# Patient Record
Sex: FEMALE | Race: WHITE | NOT HISPANIC OR LATINO | Employment: FULL TIME | URBAN - METROPOLITAN AREA
[De-identification: names, ages, dates, MRNs, and addresses within clinical notes are randomized per-mention and may not be internally consistent; named-entity substitution may affect disease eponyms.]

---

## 2018-05-08 ENCOUNTER — LAB (OUTPATIENT)
Dept: LAB | Facility: HOSPITAL | Age: 43
End: 2018-05-08

## 2018-05-08 ENCOUNTER — TRANSCRIBE ORDERS (OUTPATIENT)
Dept: ADMINISTRATIVE | Facility: HOSPITAL | Age: 43
End: 2018-05-08

## 2018-05-08 ENCOUNTER — HOSPITAL ENCOUNTER (OUTPATIENT)
Dept: CARDIOLOGY | Facility: HOSPITAL | Age: 43
Discharge: HOME | End: 2018-05-08
Attending: ORTHOPAEDIC SURGERY | Admitting: ORTHOPAEDIC SURGERY

## 2018-05-08 DIAGNOSIS — M25.561 RIGHT KNEE PAIN, UNSPECIFIED CHRONICITY: ICD-10-CM

## 2018-05-08 DIAGNOSIS — M25.561 RIGHT KNEE PAIN, UNSPECIFIED CHRONICITY: Primary | ICD-10-CM

## 2018-05-08 LAB
ALBUMIN SERPL-MCNC: 4.3 G/DL (ref 3.5–5.2)
ALBUMIN/GLOB SERPL: 1.3 G/DL
ALP SERPL-CCNC: 67 U/L (ref 39–117)
ALT SERPL W P-5'-P-CCNC: 42 U/L (ref 1–33)
ANION GAP SERPL CALCULATED.3IONS-SCNC: 15 MMOL/L
AST SERPL-CCNC: 34 U/L (ref 1–32)
BASOPHILS # BLD AUTO: 0.05 10*3/MM3 (ref 0–0.2)
BASOPHILS NFR BLD AUTO: 0.6 % (ref 0–1.5)
BILIRUB SERPL-MCNC: 0.4 MG/DL (ref 0.1–1.2)
BUN BLD-MCNC: 14 MG/DL (ref 6–20)
BUN/CREAT SERPL: 22.6 (ref 7–25)
CALCIUM SPEC-SCNC: 9.2 MG/DL (ref 8.6–10.5)
CHLORIDE SERPL-SCNC: 95 MMOL/L (ref 98–107)
CO2 SERPL-SCNC: 30 MMOL/L (ref 22–29)
CREAT BLD-MCNC: 0.62 MG/DL (ref 0.57–1)
DEPRECATED RDW RBC AUTO: 41.4 FL (ref 37–54)
EOSINOPHIL # BLD AUTO: 0.24 10*3/MM3 (ref 0–0.7)
EOSINOPHIL NFR BLD AUTO: 2.6 % (ref 0.3–6.2)
ERYTHROCYTE [DISTWIDTH] IN BLOOD BY AUTOMATED COUNT: 13.4 % (ref 11.7–13)
GFR SERPL CREATININE-BSD FRML MDRD: 105 ML/MIN/1.73
GLOBULIN UR ELPH-MCNC: 3.2 GM/DL
GLUCOSE BLD-MCNC: 92 MG/DL (ref 65–99)
HCT VFR BLD AUTO: 44 % (ref 35.6–45.5)
HGB BLD-MCNC: 14.5 G/DL (ref 11.9–15.5)
IMM GRANULOCYTES # BLD: 0.04 10*3/MM3 (ref 0–0.03)
IMM GRANULOCYTES NFR BLD: 0.4 % (ref 0–0.5)
LYMPHOCYTES # BLD AUTO: 2.87 10*3/MM3 (ref 0.9–4.8)
LYMPHOCYTES NFR BLD AUTO: 31.6 % (ref 19.6–45.3)
MCH RBC QN AUTO: 27.9 PG (ref 26.9–32)
MCHC RBC AUTO-ENTMCNC: 33 G/DL (ref 32.4–36.3)
MCV RBC AUTO: 84.8 FL (ref 80.5–98.2)
MONOCYTES # BLD AUTO: 0.95 10*3/MM3 (ref 0.2–1.2)
MONOCYTES NFR BLD AUTO: 10.5 % (ref 5–12)
NEUTROPHILS # BLD AUTO: 4.98 10*3/MM3 (ref 1.9–8.1)
NEUTROPHILS NFR BLD AUTO: 54.7 % (ref 42.7–76)
NRBC BLD MANUAL-RTO: 0 /100 WBC (ref 0–0)
PLATELET # BLD AUTO: 368 10*3/MM3 (ref 140–500)
PMV BLD AUTO: 11.4 FL (ref 6–12)
POTASSIUM BLD-SCNC: 2.8 MMOL/L (ref 3.5–5.2)
PROT SERPL-MCNC: 7.5 G/DL (ref 6–8.5)
RBC # BLD AUTO: 5.19 10*6/MM3 (ref 3.9–5.2)
SODIUM BLD-SCNC: 140 MMOL/L (ref 136–145)
WBC NRBC COR # BLD: 9.09 10*3/MM3 (ref 4.5–10.7)

## 2018-05-08 PROCEDURE — 80053 COMPREHEN METABOLIC PANEL: CPT

## 2018-05-08 PROCEDURE — 93010 ELECTROCARDIOGRAM REPORT: CPT | Performed by: INTERNAL MEDICINE

## 2018-05-08 PROCEDURE — 36415 COLL VENOUS BLD VENIPUNCTURE: CPT

## 2018-05-08 PROCEDURE — 93005 ELECTROCARDIOGRAM TRACING: CPT | Performed by: ORTHOPAEDIC SURGERY

## 2018-05-08 PROCEDURE — 85025 COMPLETE CBC W/AUTO DIFF WBC: CPT

## 2019-04-08 ENCOUNTER — HOSPITAL ENCOUNTER (OUTPATIENT)
Dept: OTHER | Facility: HOSPITAL | Age: 44
Discharge: HOME | End: 2019-04-08
Attending: FAMILY MEDICINE

## 2020-02-10 ENCOUNTER — OFFICE VISIT (OUTPATIENT)
Dept: ORTHOPEDIC SURGERY | Facility: CLINIC | Age: 45
End: 2020-02-10

## 2020-02-10 VITALS — TEMPERATURE: 98.3 F | BODY MASS INDEX: 47.52 KG/M2 | WEIGHT: 226.4 LBS | HEIGHT: 58 IN

## 2020-02-10 DIAGNOSIS — M22.2X1 PATELLOFEMORAL ARTHRALGIA OF BOTH KNEES: ICD-10-CM

## 2020-02-10 DIAGNOSIS — M79.642 BILATERAL HAND PAIN: ICD-10-CM

## 2020-02-10 DIAGNOSIS — M25.60 JOINT STIFFNESS: ICD-10-CM

## 2020-02-10 DIAGNOSIS — M25.561 PAIN IN BOTH KNEES, UNSPECIFIED CHRONICITY: Primary | ICD-10-CM

## 2020-02-10 DIAGNOSIS — M79.641 BILATERAL HAND PAIN: ICD-10-CM

## 2020-02-10 DIAGNOSIS — M25.50 POLYARTHRALGIA: ICD-10-CM

## 2020-02-10 DIAGNOSIS — M22.2X2 PATELLOFEMORAL ARTHRALGIA OF BOTH KNEES: ICD-10-CM

## 2020-02-10 DIAGNOSIS — M25.562 PAIN IN BOTH KNEES, UNSPECIFIED CHRONICITY: Primary | ICD-10-CM

## 2020-02-10 PROCEDURE — 73562 X-RAY EXAM OF KNEE 3: CPT | Performed by: PHYSICIAN ASSISTANT

## 2020-02-10 PROCEDURE — 20610 DRAIN/INJ JOINT/BURSA W/O US: CPT | Performed by: PHYSICIAN ASSISTANT

## 2020-02-10 PROCEDURE — 99203 OFFICE O/P NEW LOW 30 MIN: CPT | Performed by: PHYSICIAN ASSISTANT

## 2020-02-10 RX ORDER — GABAPENTIN 300 MG/1
300 CAPSULE ORAL 3 TIMES DAILY
COMMUNITY
Start: 2020-01-10

## 2020-02-10 RX ORDER — AMLODIPINE BESYLATE 10 MG/1
10 TABLET ORAL DAILY
COMMUNITY
Start: 2019-12-13

## 2020-02-10 RX ORDER — PANTOPRAZOLE SODIUM 40 MG/1
40 TABLET, DELAYED RELEASE ORAL DAILY
COMMUNITY
Start: 2020-02-02

## 2020-02-10 RX ORDER — NORGESTIMATE AND ETHINYL ESTRADIOL 7DAYSX3 28
1 KIT ORAL DAILY
COMMUNITY
Start: 2019-11-22

## 2020-02-10 RX ORDER — FLUCONAZOLE 150 MG/1
150 TABLET ORAL DAILY
COMMUNITY
Start: 2019-12-12

## 2020-02-10 RX ORDER — LIDOCAINE HYDROCHLORIDE 10 MG/ML
2 INJECTION, SOLUTION EPIDURAL; INFILTRATION; INTRACAUDAL; PERINEURAL
Status: COMPLETED | OUTPATIENT
Start: 2020-02-10 | End: 2020-02-10

## 2020-02-10 RX ORDER — BUSPIRONE HYDROCHLORIDE 15 MG/1
15 TABLET ORAL 2 TIMES DAILY
COMMUNITY
Start: 2019-12-13

## 2020-02-10 RX ORDER — METHYLPREDNISOLONE ACETATE 80 MG/ML
80 INJECTION, SUSPENSION INTRA-ARTICULAR; INTRALESIONAL; INTRAMUSCULAR; SOFT TISSUE
Status: COMPLETED | OUTPATIENT
Start: 2020-02-10 | End: 2020-02-10

## 2020-02-10 RX ORDER — CEFDINIR 300 MG/1
300 CAPSULE ORAL 2 TIMES DAILY
COMMUNITY
Start: 2019-12-12

## 2020-02-10 RX ORDER — FOLIC ACID 1 MG/1
1000 TABLET ORAL DAILY
COMMUNITY
Start: 2020-01-24

## 2020-02-10 RX ORDER — ATENOLOL AND CHLORTHALIDONE TABLET 100; 25 MG/1; MG/1
1 TABLET ORAL DAILY
COMMUNITY
Start: 2019-12-13

## 2020-02-10 RX ORDER — GUAIFENESIN AND CODEINE PHOSPHATE 10; 100 MG/5ML; MG/5ML
LIQUID ORAL
COMMUNITY
Start: 2019-12-12

## 2020-02-10 RX ORDER — LEVOTHYROXINE SODIUM 0.15 MG/1
150 TABLET ORAL DAILY
COMMUNITY
Start: 2019-12-04

## 2020-02-10 RX ORDER — DESVENLAFAXINE 100 MG/1
100 TABLET, EXTENDED RELEASE ORAL EVERY MORNING
COMMUNITY
Start: 2020-02-02

## 2020-02-10 RX ADMIN — LIDOCAINE HYDROCHLORIDE 2 ML: 10 INJECTION, SOLUTION EPIDURAL; INFILTRATION; INTRACAUDAL; PERINEURAL at 11:00

## 2020-02-10 RX ADMIN — METHYLPREDNISOLONE ACETATE 80 MG: 80 INJECTION, SUSPENSION INTRA-ARTICULAR; INTRALESIONAL; INTRAMUSCULAR; SOFT TISSUE at 11:00

## 2020-02-15 ENCOUNTER — RESULTS ENCOUNTER (OUTPATIENT)
Dept: ORTHOPEDIC SURGERY | Facility: CLINIC | Age: 45
End: 2020-02-15

## 2020-02-15 DIAGNOSIS — M79.642 BILATERAL HAND PAIN: ICD-10-CM

## 2020-02-15 DIAGNOSIS — M25.561 PAIN IN BOTH KNEES, UNSPECIFIED CHRONICITY: ICD-10-CM

## 2020-02-15 DIAGNOSIS — M25.562 PAIN IN BOTH KNEES, UNSPECIFIED CHRONICITY: ICD-10-CM

## 2020-02-15 DIAGNOSIS — M25.50 POLYARTHRALGIA: ICD-10-CM

## 2020-02-15 DIAGNOSIS — M25.60 JOINT STIFFNESS: ICD-10-CM

## 2020-02-15 DIAGNOSIS — M79.641 BILATERAL HAND PAIN: ICD-10-CM

## 2020-02-24 ENCOUNTER — TELEPHONE (OUTPATIENT)
Dept: ORTHOPEDIC SURGERY | Facility: CLINIC | Age: 45
End: 2020-02-24

## 2020-02-24 NOTE — TELEPHONE ENCOUNTER
Patient had labs done.  She is asking for us to call her work number with results when they are ready.  Her number is [phone], ext. [phone].

## 2020-02-28 NOTE — TELEPHONE ENCOUNTER
Contacted the patient and she had labs done at [facility].  I contacted [place] and spoke with Tierra in Medical Records.  She will be faxing the results.

## 2020-03-02 ENCOUNTER — TELEPHONE (OUTPATIENT)
Dept: ORTHOPEDIC SURGERY | Facility: CLINIC | Age: 45
End: 2020-03-02

## 2020-03-03 ENCOUNTER — TELEPHONE (OUTPATIENT)
Dept: ORTHOPEDIC SURGERY | Facility: CLINIC | Age: 45
End: 2020-03-03

## 2020-03-03 DIAGNOSIS — M79.641 BILATERAL HAND PAIN: ICD-10-CM

## 2020-03-03 DIAGNOSIS — M25.60 JOINT STIFFNESS: ICD-10-CM

## 2020-03-03 DIAGNOSIS — R76.8 DS DNA ANTIBODY POSITIVE: Primary | ICD-10-CM

## 2020-03-03 DIAGNOSIS — M79.642 BILATERAL HAND PAIN: ICD-10-CM

## 2020-03-03 DIAGNOSIS — M25.50 POLYARTHRALGIA: ICD-10-CM

## 2020-03-03 NOTE — TELEPHONE ENCOUNTER
Patient called for lab results.  I spoke with patient and notified her of her results.  She has a positive CRP, positive WAI and positive dsDNA antibody.  We discussed possible diagnoses such as systemic lupus erythematous.  I will refer to rheumatology for further evaluation.  Patient was in agreement to this plan.  And I currently have her scheduled for March 23 to follow-up on her knees and labs but I advised patient we do not need to do this appointment and instead can schedule her for 3-month follow-up in May.

## 2020-03-03 NOTE — TELEPHONE ENCOUNTER
PATIENT CALLED AND IS WANTING TO KNOW IF [facility]ICA WILL SIGN A HANDICAP PARKING PERMIT FOR HER.  IF AUTHORIZED, SHE WILL PICK THIS UP TOMORROW WHEN SHE DROPS OFF FMLA PAPERS TO BE COMPLETED

## 2020-04-07 ENCOUNTER — TELEPHONE (OUTPATIENT)
Dept: ORTHOPEDIC SURGERY | Facility: CLINIC | Age: 45
End: 2020-04-07

## 2020-04-07 NOTE — TELEPHONE ENCOUNTER
PATIENT CALLED AND STATED THAT SHE HAD A TELEVISIT WITH HER RHEUMATOLOGIST AND THEY RECOMMENDED THAT SHE BE OFF WORK FOR 2 WEEKS DUE TO THE FACT THAT PATIENT HAS LUPUS AND IS BEING SENT FOR EXTENSIVE XRAYS AND BLOOD WORK.  PATIENT STATED THEY HAVE ALSO STARTED HER ON A PAIN CREAM AND A NEW MEDICATION.  PATIENT STATED THAT NIAM MIDDLETON @ DR. PANDEY OFFICE RECOMMENDED PATIENT CALL OUR OFFICE TO HAVE MONICA GIVE HER THE NOTE TO BE OFF WORK FOR 2 WEEKS.    IS IT OK TO GIVE NOTE?

## 2020-04-08 ENCOUNTER — TELEPHONE (OUTPATIENT)
Dept: ORTHOPEDIC SURGERY | Facility: CLINIC | Age: 45
End: 2020-04-08

## 2020-04-08 NOTE — TELEPHONE ENCOUNTER
CALLED PATIENT AND WENT OVER MONICA'S MESSAGE BELOW.  PATIENT STATED THAT THE COVID NOTE WOULD BE SUFFICIENT AT THIS TIME SO SHE WILL CONTACT THE RHEUMATOLOGIST'S OFFICE TO OBTAIN THAT.

## 2020-04-08 NOTE — TELEPHONE ENCOUNTER
We can extend her note, I have spoken with rheumatologist office. Due to lack of staff they cannot currently do FMLA but will provide her with a note regarding the COVID and staying in due to immune compromised conditions. I will extend the FMLA if she needs that instead.

## 2020-05-15 ENCOUNTER — OFFICE VISIT (OUTPATIENT)
Dept: ORTHOPEDIC SURGERY | Facility: CLINIC | Age: 45
End: 2020-05-15

## 2020-05-15 VITALS — BODY MASS INDEX: 47.23 KG/M2 | TEMPERATURE: 98.4 F | WEIGHT: 225 LBS | HEIGHT: 58 IN

## 2020-05-15 DIAGNOSIS — M17.0 BILATERAL PRIMARY OSTEOARTHRITIS OF KNEE: Primary | ICD-10-CM

## 2020-05-15 DIAGNOSIS — G89.29 CHRONIC PAIN OF LEFT KNEE: ICD-10-CM

## 2020-05-15 DIAGNOSIS — M25.562 CHRONIC PAIN OF LEFT KNEE: ICD-10-CM

## 2020-05-15 PROCEDURE — 99214 OFFICE O/P EST MOD 30 MIN: CPT | Performed by: PHYSICIAN ASSISTANT

## 2020-05-15 PROCEDURE — 20610 DRAIN/INJ JOINT/BURSA W/O US: CPT | Performed by: PHYSICIAN ASSISTANT

## 2020-05-15 RX ORDER — IRON,CARBONYL/ASCORBIC ACID 100-250 MG
TABLET ORAL
COMMUNITY

## 2020-05-15 RX ORDER — MELOXICAM 7.5 MG/1
7.5 TABLET ORAL DAILY
Qty: 30 TABLET | Refills: 1 | Status: SHIPPED | OUTPATIENT
Start: 2020-05-15 | End: 2020-06-03

## 2020-05-15 RX ADMIN — METHYLPREDNISOLONE ACETATE 160 MG: 80 INJECTION, SUSPENSION INTRA-ARTICULAR; INTRALESIONAL; INTRAMUSCULAR; SOFT TISSUE at 09:15

## 2020-05-15 RX ADMIN — LIDOCAINE HYDROCHLORIDE 2 ML: 10 INJECTION, SOLUTION EPIDURAL; INFILTRATION; INTRACAUDAL; PERINEURAL at 09:15

## 2020-05-15 NOTE — PROGRESS NOTES
Procedure   Large Joint Arthrocentesis: R knee  Date/Time: 5/15/2020 9:15 AM  Consent given by: patient  Site marked: site marked  Timeout: Immediately prior to procedure a time out was called to verify the correct patient, procedure, equipment, support staff and site/side marked as required   Supporting Documentation  Indications: pain   Procedure Details  Location: knee - R knee  Preparation: Patient was prepped and draped in the usual sterile fashion  Needle size: 25 G  Approach: anteromedial  Medications administered: 2 mL lidocaine PF 1% 1 %; 160 mg methylPREDNISolone acetate 80 MG/ML  Patient tolerance: patient tolerated the procedure well with no immediate complications    Large Joint Arthrocentesis: L knee  Date/Time: 5/15/2020 9:15 AM  Consent given by: patient  Site marked: site marked  Timeout: Immediately prior to procedure a time out was called to verify the correct patient, procedure, equipment, support staff and site/side marked as required   Supporting Documentation  Indications: pain   Procedure Details  Location: knee - L knee  Preparation: Patient was prepped and draped in the usual sterile fashion  Needle size: 25 G  Approach: anteromedial  Medications administered: 2 mL lidocaine PF 1% 1 %; 160 mg methylPREDNISolone acetate 80 MG/ML  Patient tolerance: patient tolerated the procedure well with no immediate complications      Electronically signed by Amos Quijano PA-C, 05/19/20, 9:02 AM.

## 2020-05-19 PROBLEM — G89.29 CHRONIC PAIN OF LEFT KNEE: Status: ACTIVE | Noted: 2020-05-19

## 2020-05-19 PROBLEM — M17.0 BILATERAL PRIMARY OSTEOARTHRITIS OF KNEE: Status: ACTIVE | Noted: 2020-05-19

## 2020-05-19 PROBLEM — M25.562 CHRONIC PAIN OF LEFT KNEE: Status: ACTIVE | Noted: 2020-05-19

## 2020-05-19 RX ORDER — LIDOCAINE HYDROCHLORIDE 10 MG/ML
2 INJECTION, SOLUTION EPIDURAL; INFILTRATION; INTRACAUDAL; PERINEURAL
Status: COMPLETED | OUTPATIENT
Start: 2020-05-15 | End: 2020-05-15

## 2020-05-19 RX ORDER — METHYLPREDNISOLONE ACETATE 80 MG/ML
160 INJECTION, SUSPENSION INTRA-ARTICULAR; INTRALESIONAL; INTRAMUSCULAR; SOFT TISSUE
Status: COMPLETED | OUTPATIENT
Start: 2020-05-15 | End: 2020-05-15

## 2020-05-19 NOTE — PROGRESS NOTES
FOLLOW UP VISIT    Patient: Hannah Marion  ?  YOB: 1975    MRN: [id number]  ?  Chief Complaint   Patient presents with   • Left Knee - Follow-up, Pain   • Right Knee - Follow-up, Pain      ?  HPI:   Hannah Marion returns today for 3 months follow up on bilateral knee pain. Since our last visit she has had a visit with rheumatology via telehealth. She states she was not real pleased with her visit. She states her thyroid was more hyperactive than normal, her dose has been decreased from 150mcg to 137mcg and she was changed to brand name only. She states she was told her symptoms and lab findings were a result of the thyroid. She continues to report the left knee being more painful than the right. I administered a steroid injection last visit and she states it worked well but only lasted for about 2 weeks. Her rheumatology labs ordered by myself were positive for anti-dna (ds). I do not have records from the rheumatology visit but will request them. She reports she is barely getting around and that her pain level is quite severe and constant. She is currently seeing Dr. Gasca, new since previous visit, for low iron. Symptoms have now been present for greater than 5 months and she continues to experience progressive worsening. She continues to find that walking up and down stairs, sitting, squatting, walking and standing all seem to make her pain worse. She reports swelling as well as clicking and popping.    This patient is an established patient.  This problem is not new to this examiner.      Allergies:   Allergies   Allergen Reactions   • Levofloxacin Hives       Medications:   Home Medications:  Current Outpatient Medications on File Prior to Visit   Medication Sig   • amLODIPine (NORVASC) 10 MG tablet Take 10 mg by mouth Daily.   • atenolol-chlorthalidone (TENORETIC) 100-25 MG per tablet Take 1 tablet by mouth Daily.   • busPIRone (BUSPAR) 15 MG tablet Take 15 mg by mouth 2 (Two) Times a Day.    • cefdinir (OMNICEF) 300 MG capsule Take 300 mg by mouth 2 (Two) Times a Day.   • desvenlafaxine (PRISTIQ) 100 MG 24 hr tablet Take 100 mg by mouth Every Morning.   • fluconazole (DIFLUCAN) 150 MG tablet Take 150 mg by mouth Daily.   • folic acid (FOLVITE) 1 MG tablet Take 1,000 mcg by mouth Daily.   • gabapentin (NEURONTIN) 300 MG capsule Take 300 mg by mouth 3 (Three) Times a Day.   • GUAIATUSSIN -10 MG/5ML liquid TAKE 5 ML BY MOUTH EVERY 4 TO 6 HOURS   • Iron-Vitamin C (Iron 100/C) 100-250 MG tablet Take  by mouth.   • levothyroxine (SYNTHROID, LEVOTHROID) 150 MCG tablet Take 150 mcg by mouth Daily.   • pantoprazole (PROTONIX) 40 MG EC tablet Take 40 mg by mouth Daily.   • Potassium 75 MG tablet Take  by mouth 3 (Three) Times a Day.   • TRI-SPRINTEC 0.18/0.215/0.25 MG-35 MCG per tablet Take 1 tablet by mouth Daily.     No current facility-administered medications on file prior to visit.      Current Medications:  Scheduled Meds:  PRN Meds:.    I have reviewed the patient's medical history in detail and updated the computerized patient record.  Review and summarization of old records include:    Past Medical History:   Diagnosis Date   • Hypothyroidism      Past Surgical History:   Procedure Laterality Date   • GALLBLADDER SURGERY     • KNEE SURGERY     • THYROIDECTOMY       Social History     Occupational History   • Not on file   Tobacco Use   • Smoking status: Never Smoker   • Smokeless tobacco: Never Used   Substance and Sexual Activity   • Alcohol use: Not on file   • Drug use: Never   • Sexual activity: Defer     Family History   Problem Relation Age of Onset   • Rheumatologic disease Mother    • Osteoarthritis Father          Review of Systems  Constitutional: Negative.  Negative for fever.   Eyes: Negative.    Respiratory: Negative.    Cardiovascular: Negative.    Endocrine: Negative.    Musculoskeletal: Positive for arthralgias, gait problem and joint swelling.   Skin: Negative.  Negative for rash  "and wound.   Allergic/Immunologic: Negative.    Neurological: Negative for numbness.   Hematological: Negative.    Psychiatric/Behavioral: Negative.         Wt Readings from Last 3 Encounters:   05/15/20 102 kg (225 lb)   02/10/20 103 kg (226 lb 6.4 oz)     Ht Readings from Last 3 Encounters:   05/15/20 147.3 cm (58\")   02/10/20 147.3 cm (58\")     Body mass index is 47.03 kg/m².  Facility age limit for growth percentiles is 20 years.  Vitals:    05/15/20 0842   Temp: 98.4 °F (36.9 °C)         Physical Exam  Constitutional: Patient is oriented to person, place, and time. Appears well-developed and well-nourished.   HENT:   Head: Normocephalic and atraumatic.   Eyes: Conjunctivae and EOM are normal. Pupils are equal, round, and reactive to light.   Cardiovascular: Normal rate and intact distal pulses.   Pulmonary/Chest: Effort normal and breath sounds normal.   Musculoskeletal:   See detailed exam below   Neurological: Alert and oriented to person, place, and time. No sensory deficit. Coordination normal.   Skin: Skin is warm and dry. Capillary refill takes less than 2 seconds. No rash noted. No erythema.   Psychiatric: Patient has a normal mood and affect. Her behavior is normal. Judgment and thought content normal.   Nursing note and vitals reviewed.      Ortho Exam: Bilateral knees:  Positive patellar grind test with pain in the retropatellar region bilaterally.    Positive for high Q-angle with patella tracking laterally in high grades of flexion.   Skin and soft tissues are swollen, consistent with inflammatory changes of the patellofemoral joint.    Positive for significant tenderness over the medial patellofemoral ligaments.   Quadriceps mechanism is well preserved.   Range of motion: 0-120 degrees  Slightly positive for apprehension sign laterally.  Negative anterior and posterior drawer. No medial or lateral instability noted.   Dorsalis pedis and posterior tibial artery pulses are palpable. Common peroneal " nerve function is well preserved.        Diagnostics:  Reviewed xrays of left knee that was taken for Rheumatology, from 4/8/20, with summary of impression below:  · No acute bony abnormality, no changes from prior xrays taken 2/2020.  · Mild narrowing of medial joint space and patellofemoral space       Assessment:  Hannah was seen today for follow-up, pain, follow-up and pain.    Diagnoses and all orders for this visit:    Chronic pain of left knee  -     MRI Knee Left Without Contrast; Future    Bilateral primary osteoarthritis of knee  -     Large Joint Arthrocentesis: R knee  -     Large Joint Arthrocentesis: L knee  -     meloxicam (Mobic) 7.5 MG tablet; Take 1 tablet by mouth Daily.            Procedures  See separate procedure note   ?    Plan    New Medications Ordered This Visit   Medications   • meloxicam (Mobic) 7.5 MG tablet     Sig: Take 1 tablet by mouth Daily.     Dispense:  30 tablet     Refill:  1      · Injection: Intraarticular injection with steroid performed today in clinic given persistent pain and lack of response to conservative measures.  · Bilateral knee injections performed, with steroid, from an anteromedial approach. No immediate complications were observed.  · Scheduled knee MRI: Given knee symptoms and lack or response to conservative measures, will schedule MRI.   · Discussed patient's visit with rheumatology. I have not received records from that visit therefore I will request them. Once I review the records I will decide if we need to get a second opinion.   · Rest, ice, compression, and elevation (RICE) therapy  · Tylenol as needed/if clinically indicated  · Follow up will be based on MRI scheduling      Amos Quijano PA-C  Date of encounter: 05/15/2020   Electronically signed by Amos Quijano PA-C, 05/19/20, 9:27 AM.

## 2020-06-02 ENCOUNTER — OFFICE VISIT (OUTPATIENT)
Dept: ORTHOPEDIC SURGERY | Facility: CLINIC | Age: 45
End: 2020-06-02

## 2020-06-02 VITALS — TEMPERATURE: 97.8 F

## 2020-06-02 DIAGNOSIS — M22.2X2 PATELLOFEMORAL ARTHRALGIA OF BOTH KNEES: Primary | ICD-10-CM

## 2020-06-02 DIAGNOSIS — M25.562 PAIN IN BOTH KNEES, UNSPECIFIED CHRONICITY: ICD-10-CM

## 2020-06-02 DIAGNOSIS — M17.0 BILATERAL PRIMARY OSTEOARTHRITIS OF KNEE: ICD-10-CM

## 2020-06-02 DIAGNOSIS — M25.50 POLYARTHRALGIA: ICD-10-CM

## 2020-06-02 DIAGNOSIS — M22.2X1 PATELLOFEMORAL ARTHRALGIA OF BOTH KNEES: Primary | ICD-10-CM

## 2020-06-02 DIAGNOSIS — M25.561 PAIN IN BOTH KNEES, UNSPECIFIED CHRONICITY: ICD-10-CM

## 2020-06-02 DIAGNOSIS — M70.62 GREATER TROCHANTERIC BURSITIS OF LEFT HIP: ICD-10-CM

## 2020-06-02 PROCEDURE — 20610 DRAIN/INJ JOINT/BURSA W/O US: CPT | Performed by: PHYSICIAN ASSISTANT

## 2020-06-02 PROCEDURE — 99214 OFFICE O/P EST MOD 30 MIN: CPT | Performed by: PHYSICIAN ASSISTANT

## 2020-06-02 RX ADMIN — METHYLPREDNISOLONE ACETATE 160 MG: 80 INJECTION, SUSPENSION INTRA-ARTICULAR; INTRALESIONAL; INTRAMUSCULAR; SOFT TISSUE at 13:52

## 2020-06-02 RX ADMIN — LIDOCAINE HYDROCHLORIDE 2 ML: 10 INJECTION, SOLUTION EPIDURAL; INFILTRATION; INTRACAUDAL; PERINEURAL at 13:52

## 2020-06-02 NOTE — PROGRESS NOTES
Procedure   Large Joint Arthrocentesis: L greater trochanteric bursa  Date/Time: 6/2/2020 1:52 PM  Consent given by: patient  Site marked: site marked  Timeout: Immediately prior to procedure a time out was called to verify the correct patient, procedure, equipment, support staff and site/side marked as required   Supporting Documentation  Indications: pain and joint swelling   Procedure Details  Location: hip - L greater trochanteric bursa  Preparation: Patient was prepped and draped in the usual sterile fashion  Needle size: 22 G  Approach: lateral  Medications administered: 2 mL lidocaine PF 1% 1 %; 160 mg methylPREDNISolone acetate 80 MG/ML  Patient tolerance: patient tolerated the procedure well with no immediate complications      Electronically signed by Amos Quijano PA-C, 06/12/20, 3:10 PM.

## 2020-06-02 NOTE — PROGRESS NOTES
FOLLOW UP VISIT    Patient: Hannah Marion  ?  YOB: 1975    MRN: [id number]  ?  Chief Complaint   Patient presents with   • Left Knee - Pain   • Results     MRI LEFT KNEE       ?  HPI:   Hannah Marion is a 44 y.o. female who presents with follow up on left knee pain and MRI results. At last visit, 5/15/20, I injected both knees with steroid she states the effects lasted approximately 1 week.  She is experiencing significant pain and tenderness of the left knee.  She has been taking Tylenol arthritis and Mobic, they both seem to help somewhat but do not fully resolve her pain.  She is also experiencing left lateral hip pain due to her altered gait because of the left knee.  We have also discussed her visit with ORLANDO Lynch at [facility] rheumatology, I am still waiting on office notes to review.  Since she was not very pleased with her visit and the dsDNA antibody result was not addressed much I will look into referring her elsewhere or having her see another provider in the practice.    Primary Complaint:  Pain Location: left knee  Radiation: Into the medial aspect of the knee/tibia  Quality: aching, burning  Intensity/Severity: severe  Duration: 4 months  Progression of symptoms: yes, progressive worsening  Onset quality: gradual   Timing: constant  Aggravating Factors: going up and down stairs, sitting, rising after sitting, standing  Alleviating Factors: none   Previous Episodes: yes  Associated Symptoms: pain, swelling, clicking/popping  ADLs Affected: grooming/hygiene/toileting/personal care, dressing, ambulating, work related activities, recreational activities/sports  Previous Treatment: Tylenol, NSAIDs, oral pain medication, brace and intra-articular injection      This patient is an established patient.  This problem is not new to this examiner.      Allergies:   Allergies   Allergen Reactions   • Levofloxacin Hives       Medications:   Home Medications:  Current Outpatient  Medications on File Prior to Visit   Medication Sig   • amLODIPine (NORVASC) 10 MG tablet Take 10 mg by mouth Daily.   • atenolol-chlorthalidone (TENORETIC) 100-25 MG per tablet Take 1 tablet by mouth Daily.   • busPIRone (BUSPAR) 15 MG tablet Take 15 mg by mouth 2 (Two) Times a Day.   • cefdinir (OMNICEF) 300 MG capsule Take 300 mg by mouth 2 (Two) Times a Day.   • desvenlafaxine (PRISTIQ) 100 MG 24 hr tablet Take 100 mg by mouth Every Morning.   • fluconazole (DIFLUCAN) 150 MG tablet Take 150 mg by mouth Daily.   • folic acid (FOLVITE) 1 MG tablet Take 1,000 mcg by mouth Daily.   • gabapentin (NEURONTIN) 300 MG capsule Take 300 mg by mouth 3 (Three) Times a Day.   • GUAIATUSSIN -10 MG/5ML liquid TAKE 5 ML BY MOUTH EVERY 4 TO 6 HOURS   • Iron-Vitamin C (Iron 100/C) 100-250 MG tablet Take  by mouth.   • levothyroxine (SYNTHROID, LEVOTHROID) 150 MCG tablet Take 150 mcg by mouth Daily.   • pantoprazole (PROTONIX) 40 MG EC tablet Take 40 mg by mouth Daily.   • Potassium 75 MG tablet Take  by mouth 3 (Three) Times a Day.   • TRI-SPRINTEC 0.18/0.215/0.25 MG-35 MCG per tablet Take 1 tablet by mouth Daily.     No current facility-administered medications on file prior to visit.      Current Medications:  Scheduled Meds:  PRN Meds:.    I have reviewed the patient's medical history in detail and updated the computerized patient record.  Review and summarization of old records include:    Past Medical History:   Diagnosis Date   • Hypothyroidism      Past Surgical History:   Procedure Laterality Date   • GALLBLADDER SURGERY     • KNEE SURGERY     • THYROIDECTOMY       Social History     Occupational History   • Not on file   Tobacco Use   • Smoking status: Never Smoker   • Smokeless tobacco: Never Used   Substance and Sexual Activity   • Alcohol use: Not on file   • Drug use: Never   • Sexual activity: Defer     Family History   Problem Relation Age of Onset   • Rheumatologic disease Mother    • Osteoarthritis Father   "        Review of Systems  Constitutional: Negative.  Negative for fever.   Eyes: Negative.    Respiratory: Negative.    Cardiovascular: Negative.    Endocrine: Negative.    Musculoskeletal: Positive for arthralgias, gait problem and joint swelling.   Skin: Negative.  Negative for rash and wound.   Allergic/Immunologic: Negative.    Neurological: Negative for numbness.   Hematological: Negative.    Psychiatric/Behavioral: Negative.         Wt Readings from Last 3 Encounters:   05/15/20 102 kg (225 lb)   02/10/20 103 kg (226 lb 6.4 oz)     Ht Readings from Last 3 Encounters:   05/15/20 147.3 cm (58\")   02/10/20 147.3 cm (58\")     There is no height or weight on file to calculate BMI.  No height and weight on file for this encounter.  Vitals:    06/02/20 1301   Temp: 97.8 °F (36.6 °C)         Physical Exam  Constitutional: Patient is oriented to person, place, and time. Appears well-developed and well-nourished.   HENT:   Head: Normocephalic and atraumatic.   Eyes: Conjunctivae and EOM are normal. Pupils are equal, round, and reactive to light.   Cardiovascular: Normal rate and intact distal pulses.   Pulmonary/Chest: Effort normal and breath sounds normal.   Musculoskeletal:   See detailed exam below   Neurological: Alert and oriented to person, place, and time. No sensory deficit. Coordination normal.   Skin: Skin is warm and dry. Capillary refill takes less than 2 seconds. No rash noted. No erythema.   Psychiatric: Patient has a normal mood and affect. Her behavior is normal. Judgment and thought content normal.   Nursing note and vitals reviewed.      Ortho Exam:   Positive patellar grind test with pain in the retropatellar region.    Positive for high Q-angle with patella tracking laterally in high grades of flexion.   Skin and soft tissues are swollen, consistent with inflammatory changes of the patellofemoral joint.    Positive for tenderness over the medial patellofemoral ligaments.   Quadriceps mechanism is " well preserved.   Range of motion-Extension to Flexion: 0-120 degrees.  Negative anterior and posterior drawer. No medial or lateral instability noted.   Dorsalis pedis and posterior tibial artery pulses are palpable. Common peroneal nerve function is well preserved.        Diagnostics:  Reviewed MRI report of Left knee without contrast, performed at [facility] on 5/27/2020, summary of impression below:   · Menisci intact  · Possible discoid variant of the lateral meniscus  · Low-grade chondromalacia of the central femoral trochlea  · Small focus of moderate grade chondromalacia of the medial femoral condyle             Assessment:  Hannah was seen today for results and pain.    Diagnoses and all orders for this visit:    Patellofemoral arthralgia of both knees    Greater trochanteric bursitis of left hip  -     Large Joint Arthrocentesis: L greater trochanteric bursa    Pain in both knees, unspecified chronicity    Polyarthralgia          Plan    Orders Placed This Encounter   Procedures   • Large Joint Arthrocentesis: L greater trochanteric bursa      · Discussion of orthopaedic goals and activities and patient and/or guardian expressed appreciation.  · Ice, heat, and/or modalities as beneficial  · Reduced physical activity as appropriate and avoid offending activity  · Weight bearing parameters reviewed  · Reinjury Precautions  · Patient is encouraged to call or return for any issues or concerns.  · I spoke with Dr. Son regarding patient's symptoms and her interest in possible knee arthroscopy if he felt necessary.  I discussed with patient I do not think that this would be the case as her MRI does not show any type of meniscal tear or surgical issue.  Dr. Son agreed and recommended referral to bariatric surgery to discuss weight loss.  Not only were findings not indicative of an arthroscopy she would be a poor candidate for surgery due to her body mass index.  I have also discussed this with patient  and she is not interested in any type of bariatric surgical intervention.  She is most concerned about not being able to go to turn to work on June 23, 2020 due to the significant amount of pain she is in with weightbearing.  I also discussed with her that I have received records from [facility] rheumatology and I have reviewed those, but I have not been able to get in touch with the provider there that I typically refer to.  I will go ahead and put another request for referral to [facility] CISNEROS in that same practice where patient was previously seen.  If this is not possible I will then refer her to another practice such as Dr. Marilyn Galicia.  · We will send a refill for Mobic.  I will fill out FMLA or short-term disability papers if needed.  ·       Amos Quijano PA-C  Date of encounter: 06/02/2020     Electronically signed by Amos Quijano PA-C, 06/02/20, 1:48 PM.    EMR Dragon/Transcription disclaimer:  Much of this encounter note is an electronic transcription/translation of spoken language to printed text. The electronic translation of spoken language may permit erroneous, or at times, nonsensical words or phrases to be inadvertently transcribed; Although I have reviewed the note for such errors, some may still exist.

## 2020-06-03 ENCOUNTER — TELEPHONE (OUTPATIENT)
Dept: ORTHOPEDIC SURGERY | Facility: CLINIC | Age: 45
End: 2020-06-03

## 2020-06-03 RX ORDER — MELOXICAM 7.5 MG/1
TABLET ORAL
Qty: 90 TABLET | Refills: 0 | Status: SHIPPED | OUTPATIENT
Start: 2020-06-03 | End: 2020-10-07

## 2020-06-03 NOTE — TELEPHONE ENCOUNTER
PATIENT CALLED CHECKING STATUS OF HER PRESCRIPTIONS THAT WERE TO BE SENT IN FOR HER.  I LET HER KNOW THAT THE MOBIC HAS BEEN SENT TO HER PHARMACY.  PATIENT WAS THINKING ULTRAM WAS GOING TO BE PRESCRIBED ALSO.  PLEASE ADVISE

## 2020-06-05 ENCOUNTER — TELEPHONE (OUTPATIENT)
Dept: ORTHOPEDIC SURGERY | Facility: CLINIC | Age: 45
End: 2020-06-05

## 2020-06-05 DIAGNOSIS — M17.0 BILATERAL PRIMARY OSTEOARTHRITIS OF KNEE: Primary | ICD-10-CM

## 2020-06-05 RX ORDER — TRAMADOL HYDROCHLORIDE 50 MG/1
TABLET ORAL
Qty: 40 TABLET | Refills: 0 | OUTPATIENT
Start: 2020-06-05 | End: 2020-08-25

## 2020-06-09 ENCOUNTER — TELEPHONE (OUTPATIENT)
Dept: ORTHOPEDIC SURGERY | Facility: CLINIC | Age: 45
End: 2020-06-09

## 2020-06-09 DIAGNOSIS — G89.29 CHRONIC PAIN OF LEFT KNEE: Primary | ICD-10-CM

## 2020-06-09 DIAGNOSIS — M25.562 CHRONIC PAIN OF LEFT KNEE: Primary | ICD-10-CM

## 2020-06-09 NOTE — TELEPHONE ENCOUNTER
PATIENT CALLED IN TO CHECK ON THE STATUS OF POTENTIALLY SCHEDULING A LEFT KNEE ARTHROSCOPY. SHE STATES THAT SHE IS AWAITING A CALL FROM MONICA ONCE MONICA HAS TALKED TO DR. LARSEN ABOUT PROCEEDING. MONICA IS AWARE OF THAT AND IS NEEDING TO SPEAK WITH THE PATIENT'S RHEUMATOLOGIST. MONICA WILL CONTACT [facility] ONCE SHE HAS SPOKEN TO EVERYONE./AW

## 2020-06-12 PROBLEM — M70.62 GREATER TROCHANTERIC BURSITIS OF LEFT HIP: Status: ACTIVE | Noted: 2020-06-12

## 2020-06-12 RX ORDER — METHYLPREDNISOLONE ACETATE 80 MG/ML
160 INJECTION, SUSPENSION INTRA-ARTICULAR; INTRALESIONAL; INTRAMUSCULAR; SOFT TISSUE
Status: COMPLETED | OUTPATIENT
Start: 2020-06-02 | End: 2020-06-02

## 2020-06-12 RX ORDER — LIDOCAINE HYDROCHLORIDE 10 MG/ML
2 INJECTION, SOLUTION EPIDURAL; INFILTRATION; INTRACAUDAL; PERINEURAL
Status: COMPLETED | OUTPATIENT
Start: 2020-06-02 | End: 2020-06-02

## 2020-06-16 ENCOUNTER — TELEPHONE (OUTPATIENT)
Dept: ORTHOPEDIC SURGERY | Facility: CLINIC | Age: 45
End: 2020-06-16

## 2020-06-16 NOTE — TELEPHONE ENCOUNTER
I  CALLED PATIENT AND LET HER KNOW MONICA WOULD LIKE HER TO SEE RYAN CONNOLLY @ [facility] SO SHE IS GOING TO CALL AND MAKE AN APPOINTMENT, I TOLD HER IF [facility] HAD ANY PROBLEMS LET ME KNOW I I WILL MAKE THE ANIL FOR HER

## 2020-06-16 NOTE — TELEPHONE ENCOUNTER
----- Message from Amos Quijano PA-C sent at 6/12/2020 11:55 AM EDT -----  Regarding: REFERRAL  Can [facility] and see if we can get her in to see BLAYNE Shirley? She has seen Shikha Corral but we want to get her into [facility].  ----- Message -----  From: Eva Torres  Sent: 6/12/2020   8:33 AM EDT  To: Amos Quijano PA-C    PATIENT STATES WOULD LIKE TO SEE ANOTHER RHEUMATOLGIST. SHE WANTS TO SEE WHO YOU SEE.......

## 2020-06-18 ENCOUNTER — TELEPHONE (OUTPATIENT)
Dept: ORTHOPEDIC SURGERY | Facility: CLINIC | Age: 45
End: 2020-06-18

## 2020-06-18 NOTE — TELEPHONE ENCOUNTER
PER MONICA I CALLED THE PATIENT TO DISCUSS THE STATUS OF SCHEDULING HER WITH ANOTHER RHEUMATOLOGIST IN THE GROUP THAT SHE IS GOING TO. MONICA HAS TALKED TO RYAN IN THAT PRACTICE AND RYAN (ANOTHER PROVIDER IN THAT OFFICE) HAS ASSURED MONICA THAT SOMEONE IN HER OFFICE WILL BE CONTACTING [facility] TO MAKE ARRANGEMENTS FOR THEM TO TRANSFER HER CARE FROM WHO SHE SEES CURRENTLY TO HERSELF (RYAN). [facility]'S PHONE JUST STATED THAT SHE WAS UNAVAILABLE, IT WOULD NOT LET ME LEAVE A MESSAGE./AW

## 2020-06-24 ENCOUNTER — TELEPHONE (OUTPATIENT)
Dept: ORTHOPEDIC SURGERY | Facility: CLINIC | Age: 45
End: 2020-06-24

## 2020-06-24 NOTE — TELEPHONE ENCOUNTER
PATIENT CALLED AND STATED THAT SHE HAS NOT YET HEARD FROM THE RHEUMATOLOGIST'S OFFICE REGARDING AN APPOINTMENT.    SHE ALSO WANTED TO MAKE MONICA AWARE THAT HER INSURANCE HAS DENIED GEL INJECTIONS AND IS WANTING TO KNOW WHAT HER NEXT OPTION IS IN REGARD TO TREATING HER KNEE PAIN.

## 2020-06-30 DIAGNOSIS — M25.561 CHRONIC PAIN OF BOTH KNEES: Primary | ICD-10-CM

## 2020-06-30 DIAGNOSIS — M25.562 CHRONIC PAIN OF BOTH KNEES: Primary | ICD-10-CM

## 2020-06-30 DIAGNOSIS — G89.29 CHRONIC PAIN OF BOTH KNEES: Primary | ICD-10-CM

## 2020-06-30 RX ORDER — TRAMADOL HYDROCHLORIDE 50 MG/1
50 TABLET ORAL EVERY 8 HOURS PRN
Qty: 40 TABLET | Refills: 0 | Status: SHIPPED | OUTPATIENT
Start: 2020-06-30 | End: 2020-08-25

## 2020-06-30 NOTE — TELEPHONE ENCOUNTER
CALLED PATIENT TO LET HER KNOW THAT MONICA HAS REACHED OUT TO RYAN CONNOLLY TO LET HER KNOW THAT PATIENT HAS NOT HEARD FROM THEIR OFFICE.  I HAVE ALSO SCHEDULED PATIENT TO SEE DR. LARSEN ON 8/03/20 @ 2:30 PM

## 2020-06-30 NOTE — TELEPHONE ENCOUNTER
PATIENT REQUESTING A REFILL OF TRAMADOL. SHE WILL CALL BACK TOMORROW TO GET AN APPOINTMENT SCHEDULED WITH DR. LARSEN.MONICA WANTS HER TO SEE DR. LARSEN. SHE HAD BEEN DISCUSSING DATES WITH RIOS EARLIER. I ASKED HER TO CALL BACK TOMORROW BC I DO NOT HAVE ACCESS TO WORK HER IN/RJ

## 2020-08-25 ENCOUNTER — TELEPHONE (OUTPATIENT)
Dept: ORTHOPEDIC SURGERY | Facility: CLINIC | Age: 45
End: 2020-08-25

## 2020-08-25 DIAGNOSIS — M22.2X1 PATELLOFEMORAL ARTHRALGIA OF BOTH KNEES: Primary | ICD-10-CM

## 2020-08-25 DIAGNOSIS — M22.2X2 PATELLOFEMORAL ARTHRALGIA OF BOTH KNEES: Primary | ICD-10-CM

## 2020-08-25 RX ORDER — TRAMADOL HYDROCHLORIDE 50 MG/1
50 TABLET ORAL EVERY 12 HOURS PRN
Qty: 40 TABLET | Refills: 0 | Status: SHIPPED | OUTPATIENT
Start: 2020-08-25

## 2020-08-25 NOTE — TELEPHONE ENCOUNTER
Please send me an electronic prescription on this patient for Ultram 50 mg tab 1 p.o. every 12 PRN pain total 40 pills no refills and I will be glad to sign off on it.  Thank you

## 2020-08-27 NOTE — TELEPHONE ENCOUNTER
In the case that the patient calls back, please let her know I am sorry that our schedule has been crammed full and I have not had time to call. It will be Friday afternoon before I get a chance to call. If she would like to discuss with someone in the office so they could relay it to me that would be good.

## 2020-10-07 DIAGNOSIS — M17.0 BILATERAL PRIMARY OSTEOARTHRITIS OF KNEE: ICD-10-CM

## 2020-10-07 RX ORDER — MELOXICAM 7.5 MG/1
TABLET ORAL
Qty: 90 TABLET | Refills: 0 | Status: SHIPPED | OUTPATIENT
Start: 2020-10-07 | End: 2021-01-05

## 2020-10-07 NOTE — TELEPHONE ENCOUNTER
PLEASE ADVISE.  no LOC,  no head injury, no  neck pain, no back pain, no CP, or SOB,  no weakness or numbness of extremities

## 2021-02-02 DIAGNOSIS — M17.0 BILATERAL PRIMARY OSTEOARTHRITIS OF KNEE: Primary | ICD-10-CM

## 2021-02-02 RX ORDER — MELOXICAM 7.5 MG/1
TABLET ORAL
Qty: 90 TABLET | Refills: 1 | Status: SHIPPED | OUTPATIENT
Start: 2021-02-02 | End: 2021-10-18

## 2021-06-08 DIAGNOSIS — M17.0 BILATERAL PRIMARY OSTEOARTHRITIS OF KNEE: ICD-10-CM

## 2021-08-04 ENCOUNTER — TRANSCRIBE ORDERS (OUTPATIENT)
Dept: ADMINISTRATIVE | Facility: HOSPITAL | Age: 46
End: 2021-08-04

## 2021-08-04 ENCOUNTER — HOSPITAL ENCOUNTER (OUTPATIENT)
Dept: GENERAL RADIOLOGY | Facility: HOSPITAL | Age: 46
Discharge: HOME | End: 2021-08-04

## 2021-08-04 ENCOUNTER — LAB (OUTPATIENT)
Dept: LAB | Facility: HOSPITAL | Age: 46
End: 2021-08-04

## 2021-08-04 DIAGNOSIS — M54.2 NECK PAIN: ICD-10-CM

## 2021-08-04 DIAGNOSIS — M54.2 NECK PAIN: Primary | ICD-10-CM

## 2021-08-04 DIAGNOSIS — M25.559 HIP PAIN: ICD-10-CM

## 2021-08-04 DIAGNOSIS — M25.559 HIP PAIN: Primary | ICD-10-CM

## 2021-08-04 LAB
ALT SERPL W P-5'-P-CCNC: 21 U/L (ref 1–33)
AST SERPL-CCNC: 24 U/L (ref 1–32)
BACTERIA UR QL AUTO: ABNORMAL /HPF
BASOPHILS # BLD AUTO: 0.04 10*3/MM3 (ref 0–0.2)
BASOPHILS NFR BLD AUTO: 0.4 % (ref 0–1.5)
BILIRUB UR QL STRIP: NEGATIVE
BUN SERPL-MCNC: 13 MG/DL (ref 6–20)
CLARITY UR: ABNORMAL
COLOR UR: YELLOW
CREAT SERPL-MCNC: 0.68 MG/DL (ref 0.57–1)
CRP SERPL-MCNC: 1.54 MG/DL (ref 0–0.5)
DEPRECATED RDW RBC AUTO: 41.8 FL (ref 37–54)
EOSINOPHIL # BLD AUTO: 0.19 10*3/MM3 (ref 0–0.4)
EOSINOPHIL NFR BLD AUTO: 1.9 % (ref 0.3–6.2)
ERYTHROCYTE [DISTWIDTH] IN BLOOD BY AUTOMATED COUNT: 13.7 % (ref 12.3–15.4)
ERYTHROCYTE [SEDIMENTATION RATE] IN BLOOD: 27 MM/HR (ref 0–20)
GFR SERPL CREATININE-BSD FRML MDRD: 93 ML/MIN/1.73
GLUCOSE UR STRIP-MCNC: NEGATIVE MG/DL
HCT VFR BLD AUTO: 40.5 % (ref 34–46.6)
HCV AB SER DONR QL: NORMAL
HGB BLD-MCNC: 13.3 G/DL (ref 12–15.9)
HGB UR QL STRIP.AUTO: ABNORMAL
IMM GRANULOCYTES # BLD AUTO: 0.05 10*3/MM3 (ref 0–0.05)
IMM GRANULOCYTES NFR BLD AUTO: 0.5 % (ref 0–0.5)
KETONES UR QL STRIP: NEGATIVE
LEUKOCYTE ESTERASE UR QL STRIP.AUTO: NEGATIVE
LYMPHOCYTES # BLD AUTO: 3.03 10*3/MM3 (ref 0.7–3.1)
LYMPHOCYTES NFR BLD AUTO: 30.2 % (ref 19.6–45.3)
MCH RBC QN AUTO: 27.4 PG (ref 26.6–33)
MCHC RBC AUTO-ENTMCNC: 32.8 G/DL (ref 31.5–35.7)
MCV RBC AUTO: 83.3 FL (ref 79–97)
MONOCYTES # BLD AUTO: 0.59 10*3/MM3 (ref 0.1–0.9)
MONOCYTES NFR BLD AUTO: 5.9 % (ref 5–12)
MUCOUS THREADS URNS QL MICRO: ABNORMAL /HPF
NEUTROPHILS NFR BLD AUTO: 6.14 10*3/MM3 (ref 1.7–7)
NEUTROPHILS NFR BLD AUTO: 61.1 % (ref 42.7–76)
NITRITE UR QL STRIP: NEGATIVE
PH UR STRIP.AUTO: 5.5 [PH] (ref 5–8)
PLATELET # BLD AUTO: 409 10*3/MM3 (ref 140–450)
PMV BLD AUTO: 10.9 FL (ref 6–12)
PROT UR QL STRIP: NEGATIVE
RBC # BLD AUTO: 4.86 10*6/MM3 (ref 3.77–5.28)
RBC # UR: ABNORMAL /HPF
REF LAB TEST METHOD: ABNORMAL
SP GR UR STRIP: >=1.03 (ref 1–1.03)
SQUAMOUS #/AREA URNS HPF: ABNORMAL /HPF
UROBILINOGEN UR QL STRIP: ABNORMAL
WBC # BLD AUTO: 10.04 10*3/MM3 (ref 3.4–10.8)
WBC UR QL AUTO: ABNORMAL /HPF

## 2021-08-04 PROCEDURE — 86038 ANTINUCLEAR ANTIBODIES: CPT

## 2021-08-04 PROCEDURE — 86140 C-REACTIVE PROTEIN: CPT

## 2021-08-04 PROCEDURE — 86803 HEPATITIS C AB TEST: CPT

## 2021-08-04 PROCEDURE — 86225 DNA ANTIBODY NATIVE: CPT

## 2021-08-04 PROCEDURE — 36415 COLL VENOUS BLD VENIPUNCTURE: CPT

## 2021-08-04 PROCEDURE — 81374 HLA I TYPING 1 ANTIGEN LR: CPT

## 2021-08-04 PROCEDURE — 84460 ALANINE AMINO (ALT) (SGPT): CPT

## 2021-08-04 PROCEDURE — 85025 COMPLETE CBC W/AUTO DIFF WBC: CPT

## 2021-08-04 PROCEDURE — 72170 X-RAY EXAM OF PELVIS: CPT

## 2021-08-04 PROCEDURE — 82565 ASSAY OF CREATININE: CPT

## 2021-08-04 PROCEDURE — 72050 X-RAY EXAM NECK SPINE 4/5VWS: CPT

## 2021-08-04 PROCEDURE — 86200 CCP ANTIBODY: CPT

## 2021-08-04 PROCEDURE — 84450 TRANSFERASE (AST) (SGOT): CPT

## 2021-08-04 PROCEDURE — 81001 URINALYSIS AUTO W/SCOPE: CPT

## 2021-08-04 PROCEDURE — 85652 RBC SED RATE AUTOMATED: CPT

## 2021-08-04 PROCEDURE — 72200 X-RAY EXAM SI JOINTS: CPT

## 2021-08-04 PROCEDURE — 84520 ASSAY OF UREA NITROGEN: CPT

## 2021-08-04 PROCEDURE — 86431 RHEUMATOID FACTOR QUANT: CPT

## 2021-08-05 LAB
DSDNA IGG SERPL IA-ACNC: NEGATIVE [IU]/ML
NUCLEAR IGG SER IA-RTO: NEGATIVE

## 2021-08-07 LAB
CCP IGA+IGG SERPL IA-ACNC: 6 UNITS (ref 0–19)
RHEUMATOID FACT SERPL-ACNC: 10.7 IU/ML (ref 0–13.9)

## 2021-08-10 LAB — HLA-B27 QL NAA+PROBE: NEGATIVE

## 2021-10-15 ENCOUNTER — HOSPITAL ENCOUNTER (OUTPATIENT)
Dept: GENERAL RADIOLOGY | Facility: HOSPITAL | Age: 46
Discharge: HOME | End: 2021-10-15

## 2021-10-15 ENCOUNTER — OFFICE VISIT (OUTPATIENT)
Dept: ORTHOPEDIC SURGERY | Facility: CLINIC | Age: 46
End: 2021-10-15

## 2021-10-15 VITALS — HEIGHT: 58 IN | WEIGHT: 225 LBS | TEMPERATURE: 98 F | BODY MASS INDEX: 47.23 KG/M2

## 2021-10-15 DIAGNOSIS — M25.531 RIGHT WRIST PAIN: ICD-10-CM

## 2021-10-15 DIAGNOSIS — M79.641 RIGHT HAND PAIN: ICD-10-CM

## 2021-10-15 DIAGNOSIS — M17.0 BILATERAL PRIMARY OSTEOARTHRITIS OF KNEE: Primary | ICD-10-CM

## 2021-10-15 PROCEDURE — 73130 X-RAY EXAM OF HAND: CPT

## 2021-10-15 PROCEDURE — 20610 DRAIN/INJ JOINT/BURSA W/O US: CPT | Performed by: PHYSICIAN ASSISTANT

## 2021-10-15 PROCEDURE — 99214 OFFICE O/P EST MOD 30 MIN: CPT | Performed by: PHYSICIAN ASSISTANT

## 2021-10-15 PROCEDURE — 73110 X-RAY EXAM OF WRIST: CPT

## 2021-10-15 RX ORDER — SPIRONOLACTONE 50 MG/1
50 TABLET, FILM COATED ORAL EVERY MORNING
COMMUNITY
Start: 2021-08-16

## 2021-10-15 RX ORDER — ALLOPURINOL 100 MG/1
TABLET ORAL
COMMUNITY
Start: 2021-09-20

## 2021-10-15 RX ADMIN — METHYLPREDNISOLONE ACETATE 160 MG: 80 INJECTION, SUSPENSION INTRA-ARTICULAR; INTRALESIONAL; INTRAMUSCULAR; SOFT TISSUE at 10:12

## 2021-10-15 NOTE — PROGRESS NOTES
"Chief Complaint  Follow-up and Pain of the Left Knee, Follow-up and Pain of the Right Knee, and Injections    Subjective    History of Present Illness      Hannah Marion is a 46 y.o. female who presents to [facility] for is here today for injection therapy. She receives  BILATERAL knee injections of Depo-Medrol. Since last injection, patient notes moderate relief of symptoms. Treatment options have been discussed and she understands and consents.     She is here for new complaint also of right hand/wrist pain, primarily in the thumbs. She reports chronic pain in in the right hand and wrist. I have actually done a rheumatic workup on her in the past and had her seen rheumatology. She advises meloxicam does do well with managing a lot of her symptoms.       Objective   Vital Signs:   Temp 98 °F (36.7 °C)   Ht 147.3 cm (57.99\")   Wt 102 kg (225 lb)   BMI 47.04 kg/m²     Physical Exam  46 y.o. female is awake, alert, oriented, in no acute distress and well developed, well nourished.  Ortho Exam   BILATERAL knee  Positive patellar grind test with pain in the retropatellar region.    Positive for high Q-angle with patella tracking laterally in high grades of flexion.   Skin and soft tissues are swollen, consistent with inflammatory changes of the patellofemoral joint.    Positive for tenderness over the medial patellofemoral ligaments.   Quadriceps mechanism is well preserved.   Range of motion-Extension to Flexion: 0-120 degrees.  Negative anterior and posterior drawer. No medial or lateral instability noted.   Dorsalis pedis and posterior tibial artery pulses are palpable. Common peroneal nerve function is well preserved.      RIGHT hand/wrist  Positive for pain over the dorsal aspect of the DIP/PIP joints and multiple joints of the hand  Negative for presence of Heberden's nodes   negative for deformity with ulnar and radial deviation of the distal part of the DIP joint.   Flexor and " extensor tendon functions are well preserved.   Positive for pain with making a fist.      Result Review :   Radiologic studies - see below for interpretation  Right wrist and hand xrays 3 views each were ordered by Amos Quijano PA-C. Performed at [facility] on 10/15/2021. Images were independently viewed and interpreted by myself, my impression as follows:  Findings: mild degenerative changes throughout  Bony lesion: no  Soft tissues: within normal limits  Joint spaces: subnormal  Hardware appropriately positioned: not applicable  Prior studies available for comparison: yes            PROCEDURE  Large Joint Arthrocentesis: R knee  Date/Time: 10/15/2021 10:12 AM  Consent given by: patient  Site marked: site marked  Timeout: Immediately prior to procedure a time out was called to verify the correct patient, procedure, equipment, support staff and site/side marked as required   Supporting Documentation  Indications: pain and joint swelling   Procedure Details  Location: knee - R knee  Preparation: Patient was prepped and draped in the usual sterile fashion  Needle size: 25 G  Approach: anteromedial  Medications administered: 160 mg methylPREDNISolone acetate 80 MG/ML; 2 mL lidocaine (cardiac)  Patient tolerance: patient tolerated the procedure well with no immediate complications    Large Joint Arthrocentesis: L knee  Date/Time: 10/15/2021 10:12 AM  Consent given by: patient  Site marked: site marked  Timeout: Immediately prior to procedure a time out was called to verify the correct patient, procedure, equipment, support staff and site/side marked as required   Supporting Documentation  Indications: pain and joint swelling   Procedure Details  Location: knee - L knee  Preparation: Patient was prepped and draped in the usual sterile fashion  Needle size: 25 G  Approach: anteromedial  Medications administered: 160 mg methylPREDNISolone acetate 80 MG/ML; 2 mL lidocaine (cardiac)  Patient  tolerance: patient tolerated the procedure well with no immediate complications         Injection site was identified by physical examination and cleaned with Betadine and alcohol swabs. Prior to needle insertion, ethyl chloride spray was used for surface anesthesia. Sterile technique was used.       Assessment   Assessment and Plan    Problem List Items Addressed This Visit        Musculoskeletal and Injuries    Right hand pain    Relevant Orders    XR Hand 3+ View Right (Completed)    Bilateral primary osteoarthritis of knee - Primary    Relevant Orders    Large Joint Arthrocentesis: R knee    Large Joint Arthrocentesis: L knee    Right wrist pain    Relevant Orders    XR Wrist 3+ View Right (Completed)          Follow Up   • Bilateral shoulder Depo-Medrol injection was discussed with the patient. Discussed indication, risks, benefits, and alternatives. Verbal consent was given to proceed with the procedure.   • Injection was performed from anteromedial approach.  Patient tolerated the procedure well and no complications were encountered.  • Discussion of orthopedic goals and activities and patient/guardian expressed understanding.  • Ice, heat, rest, compression and elevation of extremity as beneficial  • nsaids and/or tylenol as beneficial  • Instructed to refrain from heavy activity/rest the extremity for the next 24-48 hours  • Discussion regarding possibility of cortisol flare and what to expect if this occurs  • Watch for signs and symptoms of infection  • Call if adverse effect from injection therapy   • Discussed right hand/wrist complaint. Will refill meloxicam.   • Follow up PRN  • Patient was given instructions and counseling regarding her condition or for health maintenance advice. Please see specific information pulled into the AVS if appropriate.     Amos Quijano PA-C   Date of Encounter: 10/15/2021   Electronically signed by Amos Quijano PA-C, 10/15/21, 5:52 AM EDT.   Electronically  signed by Amos Quijano PA-C, 10/31/21, 12:49 PM EDT.     BRITNEY Dragon/Transcription disclaimer:  Much of this encounter note is an electronic transcription/translation of spoken language to printed text. The electronic translation of spoken language may permit erroneous, or at times, nonsensical words or phrases to be inadvertently transcribed; Although I have reviewed the note for such errors, some may still exist.

## 2021-10-18 ENCOUNTER — TELEPHONE (OUTPATIENT)
Dept: ORTHOPEDIC SURGERY | Facility: CLINIC | Age: 46
End: 2021-10-18

## 2021-10-18 RX ORDER — MELOXICAM 7.5 MG/1
TABLET ORAL
Qty: 90 TABLET | Refills: 1 | Status: SHIPPED | OUTPATIENT
Start: 2021-10-18 | End: 2022-02-02

## 2021-10-18 NOTE — TELEPHONE ENCOUNTER
Caller: PATIENT    Relationship: SELF    Medication requested (name and dosage): MELOXICAM 7.5 MG  - PATIENT DID STATE MS. YAÑEZ MENTIONED MAKING THE DOSE HIGHER SO SHE WANTED TO CONFIRM THE MG WITH YOUR OFFICE.     Pharmacy where request should be sent: [facility] IN [place], [place]    PHONE NUMBER: [phone]    Additional details provided by patient: PATIENT SEEN MS. YAÑEZ ON Friday 10.15.21 AND WAS TOLD HER PRESCRIPTION FOR MELOXICAM WOULD BE CALLED INTO THE PHARMACY. PATIENT STATED THE PHARMACY HAD NOT RECEIVED THAT PRESCRIPTION YET SO SHE WAS CALLING TO REQUEST THAT PRESCRIPTION TO BE SET TO [facility] IN [place], [place] AND THEIR PHONE NUMBER IS [phone]. PATIENT STATED SHE WAS TAKING 7.5 MG BUT MS. YAÑEZ HAD MENTIONED PATIENT TAKING A HIGHER DOSE SO PATIENT WANTED TO CONFIRM THE MG AS WELL.     Best call back number: [phone]    Does the patient have less than a 3 day supply:  [x] Yes  [] No    Elizabeth Mcarthur Rep   10/18/21 08:40 EDT

## 2021-10-25 ENCOUNTER — TELEPHONE (OUTPATIENT)
Dept: ORTHOPEDIC SURGERY | Facility: CLINIC | Age: 46
End: 2021-10-25

## 2021-10-31 PROBLEM — M25.531 RIGHT WRIST PAIN: Status: ACTIVE | Noted: 2021-10-31

## 2021-10-31 RX ORDER — METHYLPREDNISOLONE ACETATE 80 MG/ML
160 INJECTION, SUSPENSION INTRA-ARTICULAR; INTRALESIONAL; INTRAMUSCULAR; SOFT TISSUE
Status: COMPLETED | OUTPATIENT
Start: 2021-10-15 | End: 2021-10-15

## 2022-02-01 DIAGNOSIS — M17.0 BILATERAL PRIMARY OSTEOARTHRITIS OF KNEE: ICD-10-CM

## 2022-02-02 RX ORDER — MELOXICAM 7.5 MG/1
TABLET ORAL
Qty: 90 TABLET | Refills: 1 | Status: SHIPPED | OUTPATIENT
Start: 2022-02-02

## 2024-06-05 ENCOUNTER — TRANSCRIBE ORDERS (OUTPATIENT)
Dept: ADMINISTRATIVE | Facility: HOSPITAL | Age: 49
End: 2024-06-05
Payer: COMMERCIAL

## 2024-06-05 DIAGNOSIS — Z12.31 VISIT FOR SCREENING MAMMOGRAM: Primary | ICD-10-CM

## 2024-07-01 ENCOUNTER — HOSPITAL ENCOUNTER (OUTPATIENT)
Dept: OTHER | Facility: HOSPITAL | Age: 49
Discharge: HOME | End: 2024-07-01

## 2024-07-17 ENCOUNTER — HOSPITAL ENCOUNTER (OUTPATIENT)
Dept: MAMMOGRAPHY | Facility: HOSPITAL | Age: 49
Discharge: HOME | End: 2024-07-17
Admitting: FAMILY MEDICINE
Payer: MEDICAID

## 2024-07-17 DIAGNOSIS — Z12.31 VISIT FOR SCREENING MAMMOGRAM: ICD-10-CM

## 2024-07-17 PROCEDURE — 77063 BREAST TOMOSYNTHESIS BI: CPT

## 2024-07-17 PROCEDURE — 77067 SCR MAMMO BI INCL CAD: CPT

## 2024-07-22 ENCOUNTER — TRANSCRIBE ORDERS (OUTPATIENT)
Dept: ADMINISTRATIVE | Facility: HOSPITAL | Age: 49
End: 2024-07-22
Payer: MEDICAID

## 2024-07-22 DIAGNOSIS — R92.8 ABNORMAL MAMMOGRAM: Primary | ICD-10-CM

## 2024-07-31 ENCOUNTER — HOSPITAL ENCOUNTER (OUTPATIENT)
Dept: ULTRASOUND IMAGING | Facility: HOSPITAL | Age: 49
Discharge: HOME | End: 2024-07-31
Admitting: FAMILY MEDICINE
Payer: MEDICAID

## 2024-07-31 DIAGNOSIS — R92.8 ABNORMAL MAMMOGRAM: ICD-10-CM

## 2024-07-31 PROCEDURE — 76642 ULTRASOUND BREAST LIMITED: CPT

## 2024-08-09 ENCOUNTER — OFFICE VISIT (OUTPATIENT)
Dept: NEUROSURGERY | Facility: CLINIC | Age: 49
End: 2024-08-09
Payer: OTHER MISCELLANEOUS

## 2024-08-09 VITALS
DIASTOLIC BLOOD PRESSURE: 83 MMHG | HEART RATE: 62 BPM | BODY MASS INDEX: 48.91 KG/M2 | HEIGHT: 58 IN | WEIGHT: 233 LBS | SYSTOLIC BLOOD PRESSURE: 137 MMHG

## 2024-08-09 DIAGNOSIS — M79.602 LEFT ARM PAIN: ICD-10-CM

## 2024-08-09 DIAGNOSIS — M54.2 CERVICALGIA: ICD-10-CM

## 2024-08-09 DIAGNOSIS — R20.2 NUMBNESS AND TINGLING IN LEFT ARM: ICD-10-CM

## 2024-08-09 DIAGNOSIS — M50.223 HERNIATED NUCLEUS PULPOSUS, C6-7: Primary | ICD-10-CM

## 2024-08-09 DIAGNOSIS — M48.02 FORAMINAL STENOSIS OF CERVICAL REGION: ICD-10-CM

## 2024-08-09 DIAGNOSIS — R20.0 NUMBNESS AND TINGLING IN LEFT ARM: ICD-10-CM

## 2024-08-09 RX ORDER — CYCLOBENZAPRINE HCL 10 MG
TABLET ORAL
COMMUNITY

## 2024-08-09 RX ORDER — CHOLECALCIFEROL (VITAMIN D3) 10(400)/ML
50000 DROPS ORAL
COMMUNITY
Start: 2024-04-10

## 2024-08-09 NOTE — PROGRESS NOTES
"Chief Complaint  Neck Pain, Shoulder Pain (Left ), Arm Pain (Left shoulder to hand ), Numbness (Left elbow to hand ), and Tingling (Left elbow to hand )    Subjective          Hannah Marion who is a 48 y.o. year old female who presents to [facility] for Evaluation of the Spine.     - Reason for consultation: Neck pain, arm pain, numbness, and tingling following work-related fall on 02/01/2024  - Hx of presenting complaint:    - Pain radiates to left elbow, numbness to hand, all fingers affected    - Sharp, constant pain, never goes away    - Neck pain, difficulty lying down    - Pain severity: 10/10    - No particular time of day worse    - Worsened by: Mowing, weeding, heavy lifting    - Improved by: Ice (temporary relief)  - PMHx: Arthritis in knees  - Medications:     - Previously tried: Tylenol 3 (ineffective)    - Currently using: Ibuprofen (helps more than regular Tylenol)  - Social Hx:     - Non-smoker    Was this the result of an injury or accident?: Yes, Fall while at work on 2/1/24.    History of Previous Spinal Surgery?: No     reports that she has never smoked. She has never used smokeless tobacco.    Review of Systems   Musculoskeletal:  Positive for neck pain and neck stiffness.        Objective   Vital Signs:   /83   Pulse 62   Ht 147.3 cm (57.99\")   Wt 106 kg (233 lb)   BMI 48.71 kg/m²       Physical Exam  Constitutional:       Appearance: Normal appearance. She is obese.   Neck:      Comments: Pain with ROM  Pulmonary:      Effort: Pulmonary effort is normal.   Musculoskeletal:         General: Tenderness (midline and bilateral cervical areas) present.      Comments: Neri's negative bilaterally,  Tinel's test positive at the left wrist and elbow   Neurological:      General: No focal deficit present.      Mental Status: She is alert and oriented to person, place, and time.      Sensory: Sensory deficit (reduced in left arm) present.      " Motor: Weakness (left hand ) present.      Deep Tendon Reflexes: Reflexes normal.   Psychiatric:         Mood and Affect: Mood normal.         Behavior: Behavior normal.        Neurologic Exam     Mental Status   Oriented to person, place, and time.        Result Review     I personally interpreted the MRI of cervical spine without contrast from 7/1/2024 which shows multilevel spondylosis with disc bulging at C6-C7 causing mild to moderate bilateral foraminal narrowing.  There is no convincing spinal cord compression or spinal cord signal change.     Assessment and Plan    Diagnoses and all orders for this visit:    1. Herniated nucleus pulposus, C6-7 (Primary)  -     Ambulatory Referral to Pain Management    2. Foraminal stenosis of cervical region  -     Ambulatory Referral to Pain Management    3. Cervicalgia  -     Ambulatory Referral to Pain Management    4. Left arm pain  -     Ambulatory Referral to Pain Management    5. Numbness and tingling in left arm  -     Ambulatory Referral to Pain Management    1. Cervical Spondylosis with Radiculopathy  - MRI C-spine (07/01/2024): Multilevel spondylosis, C6-C7 disc bulging causing mild-moderate bilateral foraminal narrowing. No spinal cord compression or signal change.  - Likely C6-C7 radiculopathy  - Plan:    a) Referral to pain management for epidural steroid injections    b) Consider nerve conduction studies after injection trial, particularly if left arm weakness persists    c) F/u in 8 weeks post-injections    d) Surgical option (ACDF) discussed - 50% chance of improvement for arm pain based on current symptoms, would not help neck pain    Additional Notes:  - PT previously attempted - worsened symptoms  - Discussed conservative management vs surgical options  - Recommend physical restrictions until follow-up - no lifting greater than 10 lbs, limit bending and twisting, avoid strenuous or jarring activity to spine  - Advised on OTC pain relief options  - Pt  reports significant impact on daily activities and sleep  - Workers' comp case      Follow Up   Return in about 8 weeks (around 10/4/2024).  Patient was given instructions and counseling regarding her condition or for health maintenance advice. Please see specific information pulled into the AVS if appropriate.

## 2024-08-12 ENCOUNTER — PATIENT OUTREACH (OUTPATIENT)
Dept: ONCOLOGY | Facility: HOSPITAL | Age: 49
End: 2024-08-12
Payer: MEDICAID

## 2024-08-12 ENCOUNTER — HOSPITAL ENCOUNTER (OUTPATIENT)
Dept: MAMMOGRAPHY | Facility: HOSPITAL | Age: 49
Discharge: HOME | End: 2024-08-12
Payer: MEDICAID

## 2024-08-12 DIAGNOSIS — N63.24 MASS OF LOWER INNER QUADRANT OF LEFT BREAST: ICD-10-CM

## 2024-08-12 PROCEDURE — 25010000002 LIDOCAINE 1 % SOLUTION

## 2024-08-12 PROCEDURE — 88305 TISSUE EXAM BY PATHOLOGIST: CPT | Performed by: RADIOLOGY

## 2024-08-12 PROCEDURE — A4648 IMPLANTABLE TISSUE MARKER: HCPCS

## 2024-08-12 RX ORDER — LIDOCAINE HYDROCHLORIDE AND EPINEPHRINE 10; 10 MG/ML; UG/ML
INJECTION, SOLUTION INFILTRATION; PERINEURAL
Status: COMPLETED
Start: 2024-08-12 | End: 2024-08-12

## 2024-08-12 RX ORDER — LIDOCAINE HYDROCHLORIDE 10 MG/ML
INJECTION, SOLUTION INFILTRATION; PERINEURAL
Status: COMPLETED
Start: 2024-08-12 | End: 2024-08-12

## 2024-08-12 RX ADMIN — LIDOCAINE HYDROCHLORIDE: 10 INJECTION, SOLUTION INFILTRATION; PERINEURAL at 14:45

## 2024-08-12 RX ADMIN — LIDOCAINE HYDROCHLORIDE,EPINEPHRINE BITARTRATE: 10; .01 INJECTION, SOLUTION INFILTRATION; PERINEURAL at 14:45

## 2024-08-14 LAB
CYTO UR: NORMAL
LAB AP CASE REPORT: NORMAL
LAB AP CLINICAL INFORMATION: NORMAL
PATH REPORT.FINAL DX SPEC: NORMAL
PATH REPORT.GROSS SPEC: NORMAL

## 2024-10-04 ENCOUNTER — TELEPHONE (OUTPATIENT)
Dept: NEUROSURGERY | Facility: CLINIC | Age: 49
End: 2024-10-04
Payer: MEDICAID

## 2024-10-04 ENCOUNTER — OFFICE VISIT (OUTPATIENT)
Dept: NEUROSURGERY | Facility: CLINIC | Age: 49
End: 2024-10-04
Payer: OTHER MISCELLANEOUS

## 2024-10-04 VITALS
WEIGHT: 244.9 LBS | BODY MASS INDEX: 51.4 KG/M2 | DIASTOLIC BLOOD PRESSURE: 87 MMHG | SYSTOLIC BLOOD PRESSURE: 142 MMHG | HEIGHT: 58 IN

## 2024-10-04 DIAGNOSIS — R29.898 WEAKNESS OF BOTH ARMS: ICD-10-CM

## 2024-10-04 DIAGNOSIS — M50.223 HERNIATED NUCLEUS PULPOSUS, C6-7: Primary | ICD-10-CM

## 2024-10-04 DIAGNOSIS — M54.2 CERVICALGIA: ICD-10-CM

## 2024-10-04 DIAGNOSIS — R20.2 POSITIVE TINEL SIGN: ICD-10-CM

## 2024-10-04 DIAGNOSIS — M48.02 FORAMINAL STENOSIS OF CERVICAL REGION: ICD-10-CM

## 2024-10-04 DIAGNOSIS — R20.0 NUMBNESS AND TINGLING IN LEFT ARM: ICD-10-CM

## 2024-10-04 DIAGNOSIS — M79.602 LEFT ARM PAIN: ICD-10-CM

## 2024-10-04 DIAGNOSIS — R20.2 NUMBNESS AND TINGLING IN LEFT ARM: ICD-10-CM

## 2024-10-04 NOTE — TELEPHONE ENCOUNTER
Caller: KORI JUAREZ-NURSE  FROM [employer]      Best call back number: [phone]    What form or medical record are you requesting: OV NOTE 10-4-24    Who is requesting this form or medical record from you: [employer]    How would you like to receive the form or medical records (pick-up, mail, fax): FAX  If fax, what is the fax number: [phone]

## 2024-10-04 NOTE — PROGRESS NOTES
Patient being seen for today for C6-7 HNP  .    Subjective    Hannah Marion is a 48 y.o. female that presents with C6-7 HNP  .    HPI  Previously: Last seen on 8/9/2024 for complaints of neck pain and arm pain with numbness and tingling following a work-related fall on 2/01/24.  The pain was radiating down to the left elbow with numbness to the hand and all the fingers on the left.  She had MRI of the cervical spine showing multilevel spondylosis with disc bulging at C6-C7 causing mild to moderate bilateral foraminal narrowing.  There was no convincing spinal cord compression or spinal cord signal change.  She had weakness of the left hand  strength on examination.  She had positive Tinel's testing at the left wrist and elbow.  We discussed surgery is less likely to help given nonspecific arm complaints and the severity of the stenosis.  There was referral to pain management to trial epidural steroid injections.  We discussed NCV/EMG testing to assess for median or ulnar nerve neuropathy in the left arm.  There was follow-up scheduled for 8 weeks to reassess after pain management.  There were physical restrictions recommended until follow-up.    Today: She did go to pain management. She had a procedure done with pain management. She doesn't know what injection she had and unfortunately the note is unavailable. Whatever she did helped for maybe the first week or so, then the pain was back.    She feels pain in the posterior neck today and extending to the sides and down the back. She describes it as a constant, bad, burning sensation.    She feels numbness in the left > right arm to all the fingers. She denies pain radiating in the arms/hands. She reports weakness in the arms bilaterally.    She denies other new or changed complaints.    She did PT previously and felt this worsened her pain.     reports that she has never smoked. She has never used smokeless tobacco.    Review of Systems   Musculoskeletal:   Positive for back pain, myalgias and neck pain.       Objective   Vitals:    10/04/24 1042   BP: 142/87        Physical Exam  Constitutional:       Appearance: Normal appearance.   Neck:      Comments: Pain with ROM  Musculoskeletal:         General: Tenderness (midline and bilateral cervical/thoracic spine) present.      Comments: Neri's negative bilaterally,  Tinel's test positive bilaterally at wrists and elbows   Neurological:      Mental Status: She is alert.      Sensory: No sensory deficit.      Motor: Weakness (generalized in the arms) present.      Deep Tendon Reflexes: Reflexes normal.          Result Review   I have again personally interpreted the MRI of cervical spine without contrast from 7/1/2024 which shows mild disc bulging at C6-C7 with mild to moderate bilateral foraminal stenosis.  Otherwise there is no significant stenosis.     Assessment and Plan {CC Problem List  Visit Diagnosis  ROS  Review (Popup)  [facility] Maintenance  Quality  BestPractice  Medications  SmartSets  SnapShot Encounters  Media :23}   Diagnoses and all orders for this visit:    1. Herniated nucleus pulposus, C6-7 (Primary)    2. Foraminal stenosis of cervical region    3. Cervicalgia    4. Left arm pain    5. Numbness and tingling in left arm    6. Positive Tinel sign  -     EMG & Nerve Conduction Test; Future    7. Weakness of both arms  -     EMG & Nerve Conduction Test; Future    She thinks she had an epidural with pain management. This was helpful for maybe a week or so, then the pain returned.    She has radicular symptoms as numbness and weakness in both arms. There was positive tinel's testing at the wrists and elbows, suggestive of possible median or ulnar nerve neuropathy.    The mild disc bulging at C6-C7 probably does not explain the arm complaints.    I would recommend NCV/EMG testing to evaluate for underlying median or ulnar nerve neuropathy which may contribute to her overall complaints. I will  order this today to be done in [place].    In the meantime, I do want her to continue with pain management for further injection treatment if helpful.    I am recommending the following restrictions: No heavy lifting greater than 10 lb, limit twisting and bending at the waist, avoidance of strenuous activity, off work until next follow up.    Follow Up {Instructions Charge Capture  Follow-up Communications :23}   Return in about 4 weeks (around 11/1/2024).

## 2024-10-08 ENCOUNTER — TELEPHONE (OUTPATIENT)
Dept: NEUROSURGERY | Facility: CLINIC | Age: 49
End: 2024-10-08
Payer: MEDICAID

## 2024-10-08 NOTE — TELEPHONE ENCOUNTER
Caller: CYN    Relationship: SELF    Best call back number: [phone]    What form or medical record are you requesting: LETTER FOR     Who is requesting this form or medical record from you:     How would you like to receive the form or medical records (pick-up, mail, fax): FAX    If fax, what is the fax number: [phone]    Timeframe paperwork needed: ASAP    Additional notes: PATIENT NEEDS A LETTER STATING THAT SHE IS STILL OFF WORK & HAS A 10LBS WEIGHT LIMIT AND STILL UNDER GOING   TESTING & TRYING BE SEEN BY PAIN MANAGEMENT

## 2024-10-09 NOTE — TELEPHONE ENCOUNTER
PT CALLED STATING THAT HER  OFFICE STILL HAS NOT RECEIVED THE FAX OF THE WORK NOTE.     PLEASE FAX LETTER [phone]

## 2024-11-08 ENCOUNTER — TELEPHONE (OUTPATIENT)
Dept: NEUROSURGERY | Facility: CLINIC | Age: 49
End: 2024-11-08
Payer: MEDICAID

## 2024-11-08 NOTE — TELEPHONE ENCOUNTER
"Provider: KAYLA    Caller: CYN    Relationship to Patient: SELF    Phone Number: [phone]     Reason for Call: WC NEEDS A NEW ORDER SENT TO THEM    When was the patient last seen: 10/04/24    PATIENT CALLED TO ADVISE THAT HER WC  NOTIFIED HER THAT THEY NEED A NEW ORDER SENT TO THEM FOR APPROVAL- THEY STATED THAT OUR REQUEST THAT THEY RECEIVED WAS \"VERY UNPROFESSIONAL\" - IT WAS \"ALL CHOPPED UP\" - \"IT LOOKED LIKE THEY COPIED & PASTED EVERYTHING\" THE NEW ORDER NEEDS TO BE SENT WITH OV NOTES     THE [facility] FAX: [phone]    "

## 2024-11-12 ENCOUNTER — TELEPHONE (OUTPATIENT)
Dept: NEUROSURGERY | Facility: CLINIC | Age: 49
End: 2024-11-12
Payer: MEDICAID

## 2024-11-12 NOTE — TELEPHONE ENCOUNTER
Caller: MIGUEL ANGEL    Best call back number: [phone]    What orders are you requesting (i.e. lab or imaging): EMG ORDER    Additional notes: [phone] HE IS ASKING THAT WE FAX THE EMG ORDER TO HIM SO HE CAN WORK ON GETTING APPROVAL.

## 2024-12-04 ENCOUNTER — TELEPHONE (OUTPATIENT)
Dept: NEUROSURGERY | Facility: CLINIC | Age: 49
End: 2024-12-04
Payer: MEDICAID

## 2024-12-04 NOTE — TELEPHONE ENCOUNTER
Provider: TIFFANIE GARZA    Caller: KORI WITH [employer]      Phone Number: [phone]    Reason for Call: CARRIER IS REQUESTING THAT THE PATIENT BE SCHEDULED FOR A FOLLOW UP AFTER THE EMG, SCHEDULED FOR 12-9-24, WITH DR POLANCO.  HE ASKED THAT WE FAX THE APPT DATE TO HIM AFTER SHE IS SCHEDULED TO- [phone].  PLEASE ADVISE.  THANK YOU    When was the patient last seen: 10-4-24

## 2024-12-09 ENCOUNTER — HOSPITAL ENCOUNTER (OUTPATIENT)
Facility: HOSPITAL | Age: 49
Discharge: HOME | End: 2024-12-09
Admitting: PHYSICIAN ASSISTANT
Payer: OTHER MISCELLANEOUS

## 2024-12-09 DIAGNOSIS — R29.898 WEAKNESS OF BOTH ARMS: ICD-10-CM

## 2024-12-09 DIAGNOSIS — R20.2 POSITIVE TINEL SIGN: ICD-10-CM

## 2024-12-09 PROCEDURE — 95886 MUSC TEST DONE W/N TEST COMP: CPT

## 2024-12-09 PROCEDURE — 95911 NRV CNDJ TEST 9-10 STUDIES: CPT

## 2024-12-09 NOTE — THERAPY EVALUATION
Magy is returning call.   Physical Therapy Evaluation    SUBJECTIVE  Please see EMG report for details    OBJECTIVE  Please see EMG report for details.    ASSESSMENT  This was a normal study.    Please see EMG report for details.    PLAN  LTG 1:  Today:  Complete EMG / NCV study as appropriate and provide full report to the referring provider.   Treatment:  None    Frequency/Duration:  Evaluation only and discharge today.    Pt/responsible party agrees with plan of care and has been informed of all alternatives, risks and benefits.

## 2024-12-10 ENCOUNTER — TELEPHONE (OUTPATIENT)
Dept: NEUROSURGERY | Facility: CLINIC | Age: 49
End: 2024-12-10
Payer: MEDICAID

## 2024-12-10 NOTE — TELEPHONE ENCOUNTER
PT CALLED: STATES SHE NEEDS A NEW OFF WORK STATUS NOTE EMAILED TO HER SO SHE CAN FORWARD IT TO HER [employer]. PLEASE ADVISE! THANKS!      LAST SEEN: Office Visit with Peterson Siddiqi PA-C (10/04/2024)     NEXT APPT: 12/12/24      PT CONTACT: [phone]  BEST TIME TO CALL: ANYTIME

## 2024-12-12 ENCOUNTER — OFFICE VISIT (OUTPATIENT)
Dept: NEUROSURGERY | Facility: CLINIC | Age: 49
End: 2024-12-12
Payer: OTHER MISCELLANEOUS

## 2024-12-12 ENCOUNTER — TELEPHONE (OUTPATIENT)
Dept: NEUROSURGERY | Facility: CLINIC | Age: 49
End: 2024-12-12
Payer: MEDICAID

## 2024-12-12 VITALS
WEIGHT: 245.3 LBS | HEIGHT: 58 IN | HEART RATE: 61 BPM | BODY MASS INDEX: 51.49 KG/M2 | DIASTOLIC BLOOD PRESSURE: 47 MMHG | SYSTOLIC BLOOD PRESSURE: 124 MMHG

## 2024-12-12 DIAGNOSIS — M50.223 HERNIATED NUCLEUS PULPOSUS, C6-7: Primary | ICD-10-CM

## 2024-12-12 DIAGNOSIS — R29.898 WEAKNESS OF BOTH ARMS: ICD-10-CM

## 2024-12-12 DIAGNOSIS — M54.2 CERVICALGIA: ICD-10-CM

## 2024-12-12 DIAGNOSIS — R20.2 NUMBNESS AND TINGLING IN LEFT ARM: ICD-10-CM

## 2024-12-12 DIAGNOSIS — M48.02 FORAMINAL STENOSIS OF CERVICAL REGION: ICD-10-CM

## 2024-12-12 DIAGNOSIS — R20.0 NUMBNESS AND TINGLING IN LEFT ARM: ICD-10-CM

## 2024-12-12 RX ORDER — METFORMIN HYDROCHLORIDE 500 MG/1
1 TABLET, EXTENDED RELEASE ORAL DAILY
COMMUNITY

## 2024-12-12 RX ORDER — IBUPROFEN 800 MG/1
800 TABLET, FILM COATED ORAL AS NEEDED
COMMUNITY

## 2024-12-12 RX ORDER — FLUTICASONE PROPIONATE 50 MCG
1 SPRAY, SUSPENSION (ML) NASAL AS NEEDED
COMMUNITY

## 2024-12-12 RX ORDER — ONDANSETRON 4 MG/1
4 TABLET, FILM COATED ORAL AS NEEDED
COMMUNITY

## 2024-12-12 NOTE — TELEPHONE ENCOUNTER
KORI WITH [employer] CALLED TO OBTAIN THE MOST RECENT OVN FROM 12/12/2024.  KORI STATES HE ALSO NEEDS WORK STATUS AND NEXT APPT DATE.     IF ANY QUESTIONS PLEASE CALL KORI AT PHONE NUMBER [phone]    AND THE LAST OVN CAN BE FAXED TO FAX #[phone]    THANK YOU

## 2024-12-12 NOTE — PROGRESS NOTES
Patient being seen for today for Neck Pain (Constant pain with burning sensation )  .    Subjective    Hannah Marion is a 49 y.o. female that presents with Neck Pain (Constant pain with burning sensation )  .    HPI  Previously: Last seen on 10/4/2024 for complaints of pain in the posterior neck extending to the sides and down the back, described as constant, bed and burning sensation.  She reported some numbness in the left greater than right arm to all the fingers.  She denied any radiating pain down the arms or hands.  She reported bilateral arm weakness.  She had what was likely an epidural with pain management which she reports was helpful for maybe a week and then the pain returned.  She had positive Tinel's testing on examination at the wrist and elbows bilaterally and there was an order for NCV/EMG testing to assess for possible median or ulnar nerve neuropathy.  We discussed that the mild disc bulging at C6-C7 did not explain the arm complaints.  There was plan for her to follow-up with pain management for further injection treatment if helpful.  There is continued recommendation for physical restrictions while awaiting nerve testing results.    Today: She has continued pain in the neck, burning, 15/10 today. The pain is localized more-so to the left side, into the left shoulder and down the back. She denies radiating pain in the arms. She does have numbness, tingling and weakness in the left greater than right arms and hands reportedly.    She has completed a #2 epidural, she believes, which was ineffective.    She denies any other new or changed complaints.     reports that she has never smoked. She has never used smokeless tobacco.    Review of Systems   Musculoskeletal:  Positive for neck pain.       Objective   Vitals:    12/12/24 1149   BP: 124/47   Pulse: 61        Physical Exam  Constitutional:       Appearance: Normal appearance. She is obese.   Neck:      Comments: Pain with ROM  Pulmonary:       Effort: Pulmonary effort is normal.   Musculoskeletal:         General: Tenderness (midline and bilateral cervical muscles) present.      Comments: Neri's negative bilaterally   Neurological:      General: No focal deficit present.      Mental Status: She is alert and oriented to person, place, and time.      Sensory: No sensory deficit.      Motor: Weakness (left hand ) present.      Deep Tendon Reflexes: Reflexes normal.   Psychiatric:         Mood and Affect: Mood normal.         Behavior: Behavior normal.          Result Review   I have personally reviewed the NCV/EMG report from 12/9/2024 which shows normal study of the bilateral upper extremities without evidence of cervical radiculopathy or median or ulnar nerve neuropathy.    I have personally interpreted the MRI of cervical spine without contrast from 7/1/2024 which shows mild disc bulging at C6-C7 with mild to moderate bilateral foraminal stenosis.  Otherwise there is no significant stenosis.     Assessment and Plan {CC Problem List  Visit Diagnosis  ROS  Review (Popup)  [facility]  Quality  BestPractice  Medications  SmartSets  SnapShot Encounters  Media :23}   Diagnoses and all orders for this visit:    1. Herniated nucleus pulposus, C6-7 (Primary)    2. Foraminal stenosis of cervical region    3. Cervicalgia    4. Numbness and tingling in left arm    5. Weakness of both arms    The nerve testing did not show any cervical radiculopathy or median or ulnar neuropathy of the upper extremities. Unfortunately, this is not helpful in opening up any further treatment options.    She is not a candidate for cervical spine surgery with only mild disc bulging at C6-C7, and surgery would not typically relieve neck pain, nor would it likely relieve arm complaints in the absense of arm pain.    She had an unknown injection with pain management. I unfortunately do not have the note from pain management, but from what she describes it sounds  like she had an epidural steroid injection in the cervical spine (and this is what I initially recommend she pursue with pain management). This was helpful, but lasted only for a week or so before wearing off, which is suboptimal relief.    She also previously attempted PT, but this reportedly worsened her symptoms, and conservative medication treatment with tylenol and NSAIDs were not able to offer any significant relief.    There may be other injection procedures or treatments which pain management could offer her, but as this is outside of my speciality of neurosurgery I cannot state confidently what they would consider. I do, however, believe she would be a candidate for trigger point injections (TPI) of the cervical areas which may benefit muscle involvement.    From a neurosurgical perspective, there is no further treatment I can offer and I would defer further treatment to pain management as appropriate.    From a neurosurgical perspective, there is no significant cervical spine or median or ulnar nerve pathology that I can point to recommend long-term physical restrictions and limitations in good ry. I do not expect regular life or work activities to cause significant worsening of her mild degenerative changes at C6-C7.    However, since she may consider TPI in the cervical area as a possible treatment, I will recommend today that she at least continue the previous physical restrictions and limitations until she can consult with pain management again (it historically has taken 30 days or more to follow-up with pain management so possibly for the next month or more). I am recommending the following restrictions: No heavy lifting greater than 10 lb, limit twisting and bending in the spine, avoidance of strenuous activity, off work until next follow up with pain management.    Again, I would defer to pain management if they feel it is appropriate to offer continued restrictions or limitations beyond that  point.    If she is not agreeable to pursuing further pain management treatment with TPI, or they are not recommended by pain management, then I would consider her maximally medically improved, from my perspective, based on fulfillment of available treatment options.    She will monitor for any new or worsening complaints and notify us of change, follow-up here on a PRN basis.  Follow Up {Instructions Charge Capture  Follow-up Communications :23}   Return if symptoms worsen or fail to improve.

## 2024-12-16 ENCOUNTER — TELEPHONE (OUTPATIENT)
Dept: NEUROSURGERY | Facility: CLINIC | Age: 49
End: 2024-12-16
Payer: MEDICAID

## 2024-12-16 NOTE — TELEPHONE ENCOUNTER
KORI FROM [facility] CALLED TO REQUEST EMG NERVE CONDUCTING STUDY REPORT     PLEASE FAX THIS TO:    [phone]    THANK YOU!

## 2025-02-03 ENCOUNTER — TELEPHONE (OUTPATIENT)
Dept: NEUROSURGERY | Facility: CLINIC | Age: 50
End: 2025-02-03
Payer: MEDICAID

## 2025-02-03 NOTE — TELEPHONE ENCOUNTER
Caller: CYN REDDY    Relationship: SELF    Best call back number: [phone]    What orders are you requesting (i.e. lab or imaging): NEW PAIN MANAGEMENT ORDERS UNDER HER HEALTH INSURANCE    In what timeframe would the patient need to come in: ASAP    Where will you receive your lab/imaging services: [facility]    Additional notes: PATIENT WAS SUPPOSED TO BE SEEN BY PAIN MGT FRIDAY, WORK COMP DENIED.  THEY NEED US TO SEND A NEW REFERRAL,  WITH HER HEALTH INSURANCE-SHE WILL BE A NEW PATIENT.  SHE STATES THAT THE OFFICE NEEDS TO CALL AND SCHEDULE THIS APPT WITH PAIN MANAGEMENT.

## 2025-02-04 NOTE — TELEPHONE ENCOUNTER
Spoke to [facility] and they should be able to switch referral over to health insurance without a new referral. Left patient message

## 2025-07-29 ENCOUNTER — TRANSCRIBE ORDERS (OUTPATIENT)
Dept: ADMINISTRATIVE | Facility: HOSPITAL | Age: 50
End: 2025-07-29
Payer: MEDICAID

## 2025-07-29 DIAGNOSIS — Z12.31 BREAST CANCER SCREENING BY MAMMOGRAM: Primary | ICD-10-CM
